# Patient Record
Sex: MALE | Race: WHITE | NOT HISPANIC OR LATINO | Employment: FULL TIME | URBAN - METROPOLITAN AREA
[De-identification: names, ages, dates, MRNs, and addresses within clinical notes are randomized per-mention and may not be internally consistent; named-entity substitution may affect disease eponyms.]

---

## 2023-04-06 ENCOUNTER — TELEPHONE (OUTPATIENT)
Dept: PRIMARY CARE | Facility: CLINIC | Age: 33
End: 2023-04-06
Payer: COMMERCIAL

## 2023-04-06 NOTE — TELEPHONE ENCOUNTER
[facility] called saying the pt has requested cpap supplies and they are going to fax a form over to be signed along with office notes from the last year, and a letter saying he should keep using cpap.  The fax number is [phone]

## 2023-10-10 PROBLEM — R06.2 WHEEZING: Status: ACTIVE | Noted: 2023-10-10

## 2023-10-10 PROBLEM — G47.33 OSA ON CPAP: Status: ACTIVE | Noted: 2023-10-10

## 2023-10-10 PROBLEM — E78.00 PURE HYPERCHOLESTEROLEMIA: Status: ACTIVE | Noted: 2023-10-10

## 2023-10-10 PROBLEM — F80.81 STUTTER: Status: ACTIVE | Noted: 2023-10-10

## 2023-10-10 RX ORDER — LEVOCETIRIZINE DIHYDROCHLORIDE 5 MG/1
5 TABLET, FILM COATED ORAL EVERY EVENING
COMMUNITY

## 2023-10-10 RX ORDER — ALBUTEROL SULFATE 90 UG/1
2 AEROSOL, METERED RESPIRATORY (INHALATION) EVERY 6 HOURS PRN
COMMUNITY
End: 2023-10-11 | Stop reason: SDUPTHER

## 2023-10-10 ASSESSMENT — ENCOUNTER SYMPTOMS
VOMITING: 0
DYSPHORIC MOOD: 0
ABDOMINAL PAIN: 0
DIZZINESS: 0
CONSTIPATION: 0
FEVER: 0
COUGH: 0
MYALGIAS: 0
EYE REDNESS: 0
FREQUENCY: 0
WHEEZING: 0
SHORTNESS OF BREATH: 0
ADENOPATHY: 0
DIARRHEA: 0
PALPITATIONS: 0
EYE DISCHARGE: 0
NAUSEA: 0
RHINORRHEA: 0
ARTHRALGIAS: 0
NERVOUS/ANXIOUS: 0
CHILLS: 0
BRUISES/BLEEDS EASILY: 0
HEADACHES: 0
DYSURIA: 0
POLYPHAGIA: 0
POLYDIPSIA: 0
SORE THROAT: 0
HEMATURIA: 0
FATIGUE: 0
BLOOD IN STOOL: 0

## 2023-10-10 NOTE — PROGRESS NOTES
Subjective   Patient ID: Hitesh Zuniga is a 33 y.o. male who presents for Annual Exam.  Is pt fasting? Yes  Does pt see any providers other than me: No    Does pt want flu shot? Yes  Using cpap nightly? Yes  Less tired during the day? Yes  Bps at home-135/76 yesterday  Last use of albuterol- about 1 month ago    No care team member to display    HPI  Last labs-1/2021 trigs 199, hdl 39, ldl 134, tsh nl, cmp nl, cbc nl  Due for labs- all    Cholesterol   Date Value Ref Range Status   11/30/2021 213 (H) 0 - 199 mg/dL Final     Comment:     .      AGE      DESIRABLE   BORDERLINE HIGH   HIGH     0-19 Y     0 - 169       170 - 199     >/= 200    20-24 Y     0 - 189       190 - 224     >/= 225         >24 Y     0 - 199       200 - 239     >/= 240   **All ranges are based on fasting samples. Specific   therapeutic targets will vary based on patient-specific   cardiac risk.  .   Pediatric guidelines reference:Pediatrics 2011, 128(S5).   Adult guidelines reference: NCEP ATPIII Guidelines,     NEVIN 2001, 258:2486-97  .   Venipuncture immediately after or during the    administration of Metamizole may lead to falsely   low results. Testing should be performed immediately   prior to Metamizole dosing.     12/01/2020 173 0 - 199 mg/dL Final     Comment:     .      AGE      DESIRABLE   BORDERLINE HIGH   HIGH     0-19 Y     0 - 169       170 - 199     >/= 200    20-24 Y     0 - 189       190 - 224     >/= 225         >24 Y     0 - 199       200 - 239     >/= 240   **All ranges are based on fasting samples. Specific   therapeutic targets will vary based on patient-specific   cardiac risk.  .   Pediatric guidelines reference:Pediatrics 2011, 128(S5).   Adult guidelines reference: NCEP ATPIII Guidelines,     NEVIN 2001, 258:2486-97  .   Venipuncture immediately after or during the    administration of Metamizole may lead to falsely   low results. Testing should be performed immediately   prior to Metamizole dosing.     11/27/2019 205  (H) 0 - 199 mg/dL Final     Comment:     .      AGE      DESIRABLE   BORDERLINE HIGH   HIGH     0-19 Y     0 - 169       170 - 199     >/= 200    20-24 Y     0 - 189       190 - 224     >/= 225         >24 Y     0 - 199       200 - 239     >/= 240   **All ranges are based on fasting samples. Specific   therapeutic targets will vary based on patient-specific   cardiac risk.  .   Pediatric guidelines reference:Pediatrics 2011, 128(S5).   Adult guidelines reference: NCEP ATPIII Guidelines,     NEVIN 2001, 258:2486-97  .   Venipuncture immediately after or during the    administration of Metamizole may lead to falsely   low results. Testing should be performed immediately   prior to Metamizole dosing.       Triglycerides   Date Value Ref Range Status   11/30/2021 199 (H) 0 - 149 mg/dL Final     Comment:     .      AGE      DESIRABLE   BORDERLINE HIGH   HIGH     VERY HIGH   0 D-90 D    19 - 174         ----         ----        ----  91 D- 9 Y     0 -  74        75 -  99     >/= 100      ----    10-19 Y     0 -  89        90 - 129     >/= 130      ----    20-24 Y     0 - 114       115 - 149     >/= 150      ----         >24 Y     0 - 149       150 - 199    200- 499    >/= 500  .   Venipuncture immediately after or during the    administration of Metamizole may lead to falsely   low results. Testing should be performed immediately   prior to Metamizole dosing.     12/01/2020 157 (H) 0 - 149 mg/dL Final     Comment:     .      AGE      DESIRABLE   BORDERLINE HIGH   HIGH     VERY HIGH   0 D-90 D    19 - 174         ----         ----        ----  91 D- 9 Y     0 -  74        75 -  99     >/= 100      ----    10-19 Y     0 -  89        90 - 129     >/= 130      ----    20-24 Y     0 - 114       115 - 149     >/= 150      ----         >24 Y     0 - 149       150 - 199    200- 499    >/= 500  .   Venipuncture immediately after or during the    administration of Metamizole may lead to falsely   low results. Testing should be  "performed immediately   prior to Metamizole dosing.     11/27/2019 161 (H) 0 - 149 mg/dL Final     Comment:     .      AGE      DESIRABLE   BORDERLINE HIGH   HIGH     VERY HIGH   0 D-90 D    19 - 174         ----         ----        ----  91 D- 9 Y     0 -  74        75 -  99     >/= 100      ----    10-19 Y     0 -  89        90 - 129     >/= 130      ----    20-24 Y     0 - 114       115 - 149     >/= 150      ----         >24 Y     0 - 149       150 - 199    200- 499    >/= 500  .   Venipuncture immediately after or during the    administration of Metamizole may lead to falsely   low results. Testing should be performed immediately   prior to Metamizole dosing.       HDL   Date Value Ref Range Status   11/30/2021 39.5 (A) mg/dL Final     Comment:     .      AGE      VERY LOW   LOW     NORMAL    HIGH       0-19 Y       < 35   < 40     40-45     ----    20-24 Y       ----   < 40       >45     ----      >24 Y       ----   < 40     40-60      >60  .     12/01/2020 39.0 (A) mg/dL Final     Comment:     .      AGE      VERY LOW   LOW     NORMAL    HIGH       0-19 Y       < 35   < 40     40-45     ----    20-24 Y       ----   < 40       >45     ----      >24 Y       ----   < 40     40-60      >60  .     11/27/2019 37.6 (A) mg/dL Final     Comment:     .      AGE      VERY LOW   LOW     NORMAL    HIGH       0-19 Y       < 35   < 40     40-45     ----    20-24 Y       ----   < 40       >45     ----      >24 Y       ----   < 40     40-60      >60  .       No results found for: \"LDL\"  TSH   Date Value Ref Range Status   11/30/2021 2.21 0.44 - 3.98 mIU/L Final     Comment:      TSH testing is performed using different testing    methodology at [facility] than at other    [facility] hospitals. Direct result comparisons should    only be made within the same method.       No results found for: \"A1C\"  No components found for: \"POCA1C\"  No results found for: \"ALBUR\"  No components found for: \"POCALBUR\"      Other " concerns: dog allergy-asthma kicks in-wants another med for this  Bad breath  Cpap causes sinus issues    ER/urgicare visits in the last year- none  Hospitalizations in the last year- none    FH colon ca-none  FH prostate ca-none    Exercise-  work  Diet-3 meals a day   Body mass index is 39.57 kg/m².    last dental appt- last wk    BMs-regular  Sleep-able to fall asleep and stay asleep; no snoring or apnea  no cp, swelling, sob, abd pain, n/v/d/c, blood in stool or black stools  STI testing including hiv (age 15-65) and hep c screening (18-79)-declines        Immunization History   Administered Date(s) Administered    Moderna SARS-CoV-2 Vaccination 01/06/2021, 02/03/2021, 12/11/2021    Tdap vaccine, age 7 year and older (BOOSTRIX) 01/02/2014         fractures in lifetime-none      FH heart attack, heart surgery-none  FH stroke-none    The ASCVD Risk score (Curtis DK, et al., 2019) failed to calculate for the following reasons:    The 2019 ASCVD risk score is only valid for ages 40 to 79        Review of Systems   Constitutional:  Negative for chills, fatigue and fever.   HENT:  Negative for congestion, ear pain, postnasal drip, rhinorrhea and sore throat.    Eyes:  Negative for discharge, redness and visual disturbance.   Respiratory:  Negative for cough, shortness of breath and wheezing.    Cardiovascular:  Negative for chest pain, palpitations and leg swelling.   Gastrointestinal:  Negative for abdominal pain, blood in stool, constipation, diarrhea, nausea and vomiting.   Endocrine: Negative for cold intolerance, polydipsia, polyphagia and polyuria.   Genitourinary:  Negative for dysuria, frequency, genital sores, hematuria, penile pain, testicular pain and urgency.   Musculoskeletal:  Negative for arthralgias and myalgias.   Skin:  Negative for rash.   Neurological:  Negative for dizziness, syncope and headaches.   Hematological:  Negative for adenopathy. Does not bruise/bleed easily.   Psychiatric/Behavioral:   Negative for dysphoric mood. The patient is not nervous/anxious.        Objective   Visit Vitals  /89   Pulse 70   Temp 36.6 °C (97.9 °F)      BP Readings from Last 3 Encounters:   10/11/23 150/89   06/02/22 124/82   05/05/22 (!) 172/103     Wt Readings from Last 3 Encounters:   10/11/23 125 kg (275 lb 12.8 oz)   06/02/22 126 kg (277 lb 4 oz)   05/05/22 125 kg (275 lb)           Physical Exam  Vitals reviewed.   Constitutional:       General: He is not in acute distress.     Appearance: Normal appearance. He is not ill-appearing.   HENT:      Head: Normocephalic.      Right Ear: Tympanic membrane, ear canal and external ear normal.      Left Ear: Tympanic membrane, ear canal and external ear normal.      Nose: Nose normal.      Mouth/Throat:      Mouth: Mucous membranes are moist.      Pharynx: Oropharynx is clear. No oropharyngeal exudate or posterior oropharyngeal erythema.   Eyes:      Extraocular Movements: Extraocular movements intact.      Conjunctiva/sclera: Conjunctivae normal.      Pupils: Pupils are equal, round, and reactive to light.   Cardiovascular:      Rate and Rhythm: Normal rate and regular rhythm.      Heart sounds: Normal heart sounds. No murmur heard.  Pulmonary:      Effort: Pulmonary effort is normal. No respiratory distress.      Breath sounds: Normal breath sounds. No wheezing, rhonchi or rales.   Abdominal:      General: Bowel sounds are normal. There is no distension.      Palpations: Abdomen is soft. There is no mass.      Tenderness: There is no abdominal tenderness.   Musculoskeletal:         General: No swelling or tenderness. Normal range of motion.      Cervical back: Normal range of motion and neck supple. No tenderness.      Right lower leg: No edema.      Left lower leg: No edema.   Lymphadenopathy:      Cervical: No cervical adenopathy.   Skin:     General: Skin is warm.      Findings: No rash.   Neurological:      General: No focal deficit present.      Mental Status: He  is alert and oriented to person, place, and time.      Cranial Nerves: No cranial nerve deficit.   Psychiatric:         Mood and Affect: Mood normal.         Behavior: Behavior normal.         Assessment/Plan   Diagnoses and all orders for this visit:  Routine general medical examination at a health care facility  -     Comprehensive Metabolic Panel; Future  -     CBC and Auto Differential; Future  -     Lipid Panel; Future  -     TSH with reflex to Free T4 if abnormal; Future  BMI 39.0-39.9,adult  Class 2 severe obesity due to excess calories with serious comorbidity and body mass index (BMI) of 39.0 to 39.9 in adult (CMS/[facility])  Seasonal allergies  -     montelukast (Singulair) 10 mg tablet; Take 1 tablet (10 mg) by mouth once daily at bedtime.  Wheezing  -     albuterol 90 mcg/actuation inhaler; Inhale 2 puffs every 6 hours if needed for wheezing.  Bad breath  -     pantoprazole (ProtoNix) 40 mg EC tablet; Take 1 tablet (40 mg) by mouth once daily. Do not crush, chew, or split.  Other orders  -     Flu vaccine (IIV4) age 6 months and greater, preservative free  -     Follow Up In Primary Care - Established; Future

## 2023-10-11 ENCOUNTER — OFFICE VISIT (OUTPATIENT)
Dept: PRIMARY CARE | Facility: CLINIC | Age: 33
End: 2023-10-11
Payer: COMMERCIAL

## 2023-10-11 VITALS
HEIGHT: 70 IN | SYSTOLIC BLOOD PRESSURE: 135 MMHG | TEMPERATURE: 97.9 F | WEIGHT: 275.8 LBS | HEART RATE: 70 BPM | DIASTOLIC BLOOD PRESSURE: 76 MMHG | BODY MASS INDEX: 39.48 KG/M2

## 2023-10-11 DIAGNOSIS — Z00.00 ROUTINE GENERAL MEDICAL EXAMINATION AT A HEALTH CARE FACILITY: Primary | ICD-10-CM

## 2023-10-11 DIAGNOSIS — E66.01 CLASS 2 SEVERE OBESITY DUE TO EXCESS CALORIES WITH SERIOUS COMORBIDITY AND BODY MASS INDEX (BMI) OF 39.0 TO 39.9 IN ADULT (MULTI): ICD-10-CM

## 2023-10-11 DIAGNOSIS — J30.2 SEASONAL ALLERGIES: ICD-10-CM

## 2023-10-11 DIAGNOSIS — R19.6 BAD BREATH: ICD-10-CM

## 2023-10-11 DIAGNOSIS — R06.2 WHEEZING: ICD-10-CM

## 2023-10-11 PROBLEM — E66.812 CLASS 2 SEVERE OBESITY DUE TO EXCESS CALORIES WITH SERIOUS COMORBIDITY AND BODY MASS INDEX (BMI) OF 39.0 TO 39.9 IN ADULT: Status: ACTIVE | Noted: 2023-10-11

## 2023-10-11 LAB
NON-UH HIE A/G RATIO: 1.3
NON-UH HIE ALB: 4.4 G/DL (ref 3.4–5)
NON-UH HIE ALK PHOS: 83 UNIT/L (ref 46–116)
NON-UH HIE BASO COUNT: 0.1 X1000 (ref 0–0.2)
NON-UH HIE BASOS %: 1.4 %
NON-UH HIE BILIRUBIN, TOTAL: 0.6 MG/DL (ref 0.2–1)
NON-UH HIE BUN/CREAT RATIO: 13.3
NON-UH HIE BUN: 12 MG/DL (ref 9–23)
NON-UH HIE CALCIUM: 9.4 MG/DL (ref 8.7–10.4)
NON-UH HIE CALCULATED LDL CHOLESTEROL: 124 MG/DL (ref 60–130)
NON-UH HIE CALCULATED OSMOLALITY: 279 MOSM/KG (ref 275–295)
NON-UH HIE CHLORIDE: 106 MMOL/L (ref 98–107)
NON-UH HIE CHOLESTEROL: 192 MG/DL (ref 100–200)
NON-UH HIE CO2, VENOUS: 26 MMOL/L (ref 20–31)
NON-UH HIE CREATININE: 0.9 MG/DL (ref 0.6–1.1)
NON-UH HIE DIFF?: NO
NON-UH HIE EOS COUNT: 0.57 X1000 (ref 0–0.5)
NON-UH HIE EOSIN %: 7.6 %
NON-UH HIE GFR AA: >60
NON-UH HIE GLOBULIN: 3.3 G/DL
NON-UH HIE GLOMERULAR FILTRATION RATE: >60 ML/MIN/1.73M?
NON-UH HIE GLUCOSE: 99 MG/DL (ref 74–106)
NON-UH HIE GOT: 41 UNIT/L (ref 15–37)
NON-UH HIE GPT: 55 UNIT/L (ref 10–49)
NON-UH HIE HCT: 46.5 % (ref 41–52)
NON-UH HIE HDL CHOLESTEROL: 47 MG/DL (ref 40–60)
NON-UH HIE HGB: 15.5 G/DL (ref 13.5–17.5)
NON-UH HIE INSTR WBC: 7.5
NON-UH HIE K: 3.9 MMOL/L (ref 3.5–5.1)
NON-UH HIE LYMPH %: 24.8 %
NON-UH HIE LYMPH COUNT: 1.86 X1000 (ref 1.2–4.8)
NON-UH HIE MCH: 29.4 PG (ref 27–34)
NON-UH HIE MCHC: 33.4 G/DL (ref 32–37)
NON-UH HIE MCV: 88 FL (ref 80–100)
NON-UH HIE MONO %: 5.5 %
NON-UH HIE MONO COUNT: 0.42 X1000 (ref 0.1–1)
NON-UH HIE MPV: 7.2 FL (ref 7.4–10.4)
NON-UH HIE NA: 140 MMOL/L (ref 135–145)
NON-UH HIE NEUTROPHIL %: 60.8 %
NON-UH HIE NEUTROPHIL COUNT (ANC): 4.57 X1000 (ref 1.4–8.8)
NON-UH HIE NUCLEATED RBC: 0 /100WBC
NON-UH HIE PLATELET: 297 X10 (ref 150–450)
NON-UH HIE RBC: 5.29 X10 (ref 4.7–6.1)
NON-UH HIE RDW: 13 % (ref 11.5–14.5)
NON-UH HIE TOTAL CHOL/HDL CHOL RATIO: 4.1
NON-UH HIE TOTAL PROTEIN: 7.7 G/DL (ref 5.7–8.2)
NON-UH HIE TRIGLYCERIDES: 104 MG/DL (ref 30–150)
NON-UH HIE TSH: 0.66 UIU/ML (ref 0.55–4.78)
NON-UH HIE WBC: 7.5 X10 (ref 4.5–11)

## 2023-10-11 PROCEDURE — 99395 PREV VISIT EST AGE 18-39: CPT | Performed by: NURSE PRACTITIONER

## 2023-10-11 PROCEDURE — 3008F BODY MASS INDEX DOCD: CPT | Performed by: NURSE PRACTITIONER

## 2023-10-11 PROCEDURE — 1036F TOBACCO NON-USER: CPT | Performed by: NURSE PRACTITIONER

## 2023-10-11 PROCEDURE — 90686 IIV4 VACC NO PRSV 0.5 ML IM: CPT | Performed by: NURSE PRACTITIONER

## 2023-10-11 PROCEDURE — 90471 IMMUNIZATION ADMIN: CPT | Performed by: NURSE PRACTITIONER

## 2023-10-11 RX ORDER — ALBUTEROL SULFATE 90 UG/1
2 AEROSOL, METERED RESPIRATORY (INHALATION) EVERY 6 HOURS PRN
Qty: 18 G | Refills: 1 | Status: SHIPPED | OUTPATIENT
Start: 2023-10-11

## 2023-10-11 RX ORDER — PANTOPRAZOLE SODIUM 40 MG/1
40 TABLET, DELAYED RELEASE ORAL DAILY
Qty: 30 TABLET | Refills: 12 | Status: SHIPPED | OUTPATIENT
Start: 2023-10-11 | End: 2023-11-06

## 2023-10-11 RX ORDER — MONTELUKAST SODIUM 10 MG/1
10 TABLET ORAL NIGHTLY
Qty: 90 TABLET | Refills: 4 | Status: SHIPPED | OUTPATIENT
Start: 2023-10-11 | End: 2025-01-03

## 2023-10-11 ASSESSMENT — PATIENT HEALTH QUESTIONNAIRE - PHQ9
1. LITTLE INTEREST OR PLEASURE IN DOING THINGS: NOT AT ALL
2. FEELING DOWN, DEPRESSED OR HOPELESS: NOT AT ALL
SUM OF ALL RESPONSES TO PHQ9 QUESTIONS 1 AND 2: 0

## 2023-10-11 NOTE — PATIENT INSTRUCTIONS
Add singulair for allergies and asthma    Trial of pantoprazole for bad breath  If not helping see dentist    Med refilled. The number of refills on the meds match when you need to return to the office for an appt. I recommend making your next appt today so you don't run out of your medication as it may take me up to 3 days to refill it.    Handouts given to pt:  physical handout      Labs:    You can use the lab in our building when fasting. The hrs are: Monday-Friday, 7 a.m. - 5 p.m., Saturday 8 a.m. - 12 noon.   No appt needed, BUT YOU DO NEED THE PAPER ORDER.    Fasting is no food, drink, gum or mints other than water for 12 hrs.   Results will be back in 2-3 business days for most labs. It is always recommended for any orders (labs, xrays, ultrasounds,MRI, ct scan, procedures etc) to check with your insurance provider for expected costs or expenses to you.         You will get your results via phone from my medical assistant if you do not have MyChart.  OR  You will get your results via [employer]    If a result is urgent, I will call to speak to you.    Vaccines:  ---- flu vaccine-today      General recommendations:  Exercise-cardio 4-5d/wk 30min each day  Diet-Breakfast-toast (my favorite Lexy Harris Delighful Multigrain or Fausto's Killer Bread Good Seed thin-sliced)/bagel/English muffin-whole wheat flour as a 1st ingredient or cereal/oatmeal/granola bar-fiber 4g or more or protein like eggs or peanut butter; optional veggies  Lunch-protein, 1/2c carb or 2 slices bread, veg 1c  Dinner-protein, fist sized carb, veg 1c  Fruit 2 a day  Dairy 2 a day-milk, soy milk, almond milk, cheese, yogurt, cottage cheese  Snacks-Protein-hard boiled egg, nuts (walnuts/almonds/pecans/pistachios 1/4c), hummus, beef/deer jerky or meat sticks; vegetable, fruit, dairy-milk(1%, skim, almond, soy)/cheese (not a lot of cheddar)/yogurt (Greek is best-my favorite Dannon Fruit on the Bottom Greek)/cottage cheese 2%; triscuits/ popcorn/wheat  thins have a lot of fiber; follow serving size on bag/box/container  increase water  Limit alcohol to 1 drink per day for women and 2 drinks per day for men (1 drink=12oz beer or 5oz wine or 1 1/2oz liquor)  Calcium: 500mg 1 twice a day if age 50 and younger and 600mg 1 twice a day if over age 50 (calcium citrate can be taken without food)  Vitamin D: 800-5000 IU/day  Limit salt to <2300mg a day if age 50 and under and <1500mg a day if over age 50/have high bp or diabetes or kidney disease  Recommend folate for childbearing age women 0.4mg per day (can be found in a multivitamin)  Recommend 18mg/dL of iron a day if age 50 and under and 8mg/dL a day if over age 50; take on an empty stomach at bedtime  Use sunscreen   Wear seatbelt  Recommend safe sex practices: using condoms everytime you have sex, discuss with a new partner about their past partners/history of STDs/drug use, avoid drinking alcohol or using drugs as this increases the chance that you will participate in high-risk sex, for oral sex help protect your mouth by having your partner use a condom (male or female), women should not douche after sex, be aware of your partner's body and your body-look for signs of a sore, blister, rash, or discharge, and have regular exams and periodic tests for STDs.  No distracted driving  No driving when under influence of substances  Wear a seatbelt  Eye dr every 1-2yrs  Dentist every 6-12 mon  Tetanus shot every 10yrs  Recommend flu vaccine in the fall  Appt in 6mon for follow up on diet, exercise, cholesterol and 1 year for physical      I will communicate with you via [employer] regarding messages and results. If you need help with this, you can call the support line at [phone].    IT WAS A PLEASURE TO SEE YOU TODAY. THANK YOU FOR CHOOSING US FOR YOUR HEALTHCARE NEEDS.

## 2023-11-06 DIAGNOSIS — R19.6 BAD BREATH: ICD-10-CM

## 2023-11-06 RX ORDER — PANTOPRAZOLE SODIUM 40 MG/1
40 TABLET, DELAYED RELEASE ORAL DAILY
Qty: 90 TABLET | Refills: 2 | Status: SHIPPED | OUTPATIENT
Start: 2023-11-06 | End: 2024-01-22 | Stop reason: ALTCHOICE

## 2024-01-05 ENCOUNTER — PATIENT MESSAGE (OUTPATIENT)
Dept: PRIMARY CARE | Facility: CLINIC | Age: 34
End: 2024-01-05

## 2024-01-05 DIAGNOSIS — I10 PRIMARY HYPERTENSION: Primary | ICD-10-CM

## 2024-01-05 RX ORDER — LISINOPRIL 10 MG/1
10 TABLET ORAL DAILY
Qty: 30 TABLET | Refills: 1 | Status: SHIPPED | OUTPATIENT
Start: 2024-01-05 | End: 2024-01-22 | Stop reason: ALTCHOICE

## 2024-01-20 ASSESSMENT — ENCOUNTER SYMPTOMS
WHEEZING: 0
CONSTITUTIONAL NEGATIVE: 1
SHORTNESS OF BREATH: 0

## 2024-01-20 NOTE — PROGRESS NOTES
Subjective   Patient ID: Hitesh Zuniga is a 33 y.o. male who presents for Follow-up.  Last physical:10/11/23      BPs at home (put an avg of the numbers)- 140/90  150/98      Any other questions or concerns that pt wants to discuss today? No     Hypertension  This is a new problem. The current episode started 1 to 4 weeks ago. The problem has been waxing and waning since onset. The problem is controlled. Associated symptoms include anxiety. Pertinent negatives include no chest pain or shortness of breath. There are no associated agents to hypertension. Risk factors for coronary artery disease include obesity. Compliance problems include psychosocial issues.      Last labs-10/2023   Sugar (aka glucose), kidney function, liver function and electrolytes in the CMP (comprehensive metabolic panel) were normal.  CBC (complete blood count) was normal which looks at infection and anemia markers.  TSH (thyroid test) was normal.  Hdl and trigs normal.  Ldl high at 124. Goal <100. Decr fats and incr fiber.  Due for labs- none    Cholesterol   Date Value Ref Range Status   11/30/2021 213 (H) 0 - 199 mg/dL Final     Comment:     .      AGE      DESIRABLE   BORDERLINE HIGH   HIGH     0-19 Y     0 - 169       170 - 199     >/= 200    20-24 Y     0 - 189       190 - 224     >/= 225         >24 Y     0 - 199       200 - 239     >/= 240   **All ranges are based on fasting samples. Specific   therapeutic targets will vary based on patient-specific   cardiac risk.  .   Pediatric guidelines reference:Pediatrics 2011, 128(S5).   Adult guidelines reference: NCEP ATPIII Guidelines,     NEVIN 2001, 258:2486-97  .   Venipuncture immediately after or during the    administration of Metamizole may lead to falsely   low results. Testing should be performed immediately   prior to Metamizole dosing.     12/01/2020 173 0 - 199 mg/dL Final     Comment:     .      AGE      DESIRABLE   BORDERLINE HIGH   HIGH     0-19 Y     0 - 169       170 - 199      >/= 200    20-24 Y     0 - 189       190 - 224     >/= 225         >24 Y     0 - 199       200 - 239     >/= 240   **All ranges are based on fasting samples. Specific   therapeutic targets will vary based on patient-specific   cardiac risk.  .   Pediatric guidelines reference:Pediatrics 2011, 128(S5).   Adult guidelines reference: NCEP ATPIII Guidelines,     NEVIN 2001, 258:2486-97  .   Venipuncture immediately after or during the    administration of Metamizole may lead to falsely   low results. Testing should be performed immediately   prior to Metamizole dosing.     11/27/2019 205 (H) 0 - 199 mg/dL Final     Comment:     .      AGE      DESIRABLE   BORDERLINE HIGH   HIGH     0-19 Y     0 - 169       170 - 199     >/= 200    20-24 Y     0 - 189       190 - 224     >/= 225         >24 Y     0 - 199       200 - 239     >/= 240   **All ranges are based on fasting samples. Specific   therapeutic targets will vary based on patient-specific   cardiac risk.  .   Pediatric guidelines reference:Pediatrics 2011, 128(S5).   Adult guidelines reference: NCEP ATPIII Guidelines,     NEVIN 2001, 258:2486-97  .   Venipuncture immediately after or during the    administration of Metamizole may lead to falsely   low results. Testing should be performed immediately   prior to Metamizole dosing.       Triglycerides   Date Value Ref Range Status   11/30/2021 199 (H) 0 - 149 mg/dL Final     Comment:     .      AGE      DESIRABLE   BORDERLINE HIGH   HIGH     VERY HIGH   0 D-90 D    19 - 174         ----         ----        ----  91 D- 9 Y     0 -  74        75 -  99     >/= 100      ----    10-19 Y     0 -  89        90 - 129     >/= 130      ----    20-24 Y     0 - 114       115 - 149     >/= 150      ----         >24 Y     0 - 149       150 - 199    200- 499    >/= 500  .   Venipuncture immediately after or during the    administration of Metamizole may lead to falsely   low results. Testing should be performed immediately   prior to  Metamizole dosing.     12/01/2020 157 (H) 0 - 149 mg/dL Final     Comment:     .      AGE      DESIRABLE   BORDERLINE HIGH   HIGH     VERY HIGH   0 D-90 D    19 - 174         ----         ----        ----  91 D- 9 Y     0 -  74        75 -  99     >/= 100      ----    10-19 Y     0 -  89        90 - 129     >/= 130      ----    20-24 Y     0 - 114       115 - 149     >/= 150      ----         >24 Y     0 - 149       150 - 199    200- 499    >/= 500  .   Venipuncture immediately after or during the    administration of Metamizole may lead to falsely   low results. Testing should be performed immediately   prior to Metamizole dosing.     11/27/2019 161 (H) 0 - 149 mg/dL Final     Comment:     .      AGE      DESIRABLE   BORDERLINE HIGH   HIGH     VERY HIGH   0 D-90 D    19 - 174         ----         ----        ----  91 D- 9 Y     0 -  74        75 -  99     >/= 100      ----    10-19 Y     0 -  89        90 - 129     >/= 130      ----    20-24 Y     0 - 114       115 - 149     >/= 150      ----         >24 Y     0 - 149       150 - 199    200- 499    >/= 500  .   Venipuncture immediately after or during the    administration of Metamizole may lead to falsely   low results. Testing should be performed immediately   prior to Metamizole dosing.       HDL   Date Value Ref Range Status   11/30/2021 39.5 (A) mg/dL Final     Comment:     .      AGE      VERY LOW   LOW     NORMAL    HIGH       0-19 Y       < 35   < 40     40-45     ----    20-24 Y       ----   < 40       >45     ----      >24 Y       ----   < 40     40-60      >60  .     12/01/2020 39.0 (A) mg/dL Final     Comment:     .      AGE      VERY LOW   LOW     NORMAL    HIGH       0-19 Y       < 35   < 40     40-45     ----    20-24 Y       ----   < 40       >45     ----      >24 Y       ----   < 40     40-60      >60  .     11/27/2019 37.6 (A) mg/dL Final     Comment:     .      AGE      VERY LOW   LOW     NORMAL    HIGH       0-19 Y       < 35   < 40     40-45     " ----    20-24 Y       ----   < 40       >45     ----      >24 Y       ----   < 40     40-60      >60  .       No results found for: \"LDL\"  TSH   Date Value Ref Range Status   11/30/2021 2.21 0.44 - 3.98 mIU/L Final     Comment:      TSH testing is performed using different testing    methodology at [facility] than at other    [facility] hospitals. Direct result comparisons should    only be made within the same method.       No results found for: \"A1C\"  No components found for: \"POCA1C\"  No results found for: \"ALBUR\"  No components found for: \"POCALBUR\"    1/5/24 pt messaged me:  I have been taking my blood pressure regularly this week and it's been a consistent BP of around 170/90.   1/5/24 pt started on lisinopril  No cp, heart racing, sob, wheezing, dizziness, HA or vision change    Bps at home now:140-50s/90s    Started Baby aspirin 81mg 1 a day    Exercise-walk and jog on treadmill and wts but then stopped due to home project; is going to restart  Diet-3 meals a day-dinner bigger  Water, green tea    Immunization History   Administered Date(s) Administered    Flu vaccine (IIV4), preservative free *Check age/dose* 10/11/2023    Moderna SARS-CoV-2 Vaccination 01/06/2021, 02/03/2021, 12/11/2021    Tdap vaccine, age 7 year and older (BOOSTRIX) 01/02/2014        No care team member to display     Review of Systems   Constitutional: Negative.    Respiratory:  Negative for shortness of breath and wheezing.    Cardiovascular:  Negative for chest pain.       Objective   Visit Vitals  /88   Pulse 81   Temp 36.2 °C (97.1 °F)      BP Readings from Last 3 Encounters:   01/22/24 151/88   10/11/23 135/76   06/02/22 124/82     Wt Readings from Last 3 Encounters:   01/22/24 126 kg (276 lb 12.8 oz)   10/11/23 125 kg (275 lb 12.8 oz)   06/02/22 126 kg (277 lb 4 oz)       The ASCVD Risk score (Curtis DUVAL, et al., 2019) failed to calculate for the following reasons:    The 2019 ASCVD risk score is only valid for " ages 40 to 79     Physical Exam  Constitutional:       General: He is not in acute distress.     Appearance: Normal appearance.   Neurological:      Mental Status: He is alert.       Assessment/Plan   Diagnoses and all orders for this visit:  Primary hypertension  -     lisinopril 20 mg tablet; Take 1 tablet (20 mg) by mouth once daily.  BMI 39.0-39.9,adult  Class 2 severe obesity due to excess calories with serious comorbidity and body mass index (BMI) of 39.0 to 39.9 in adult (CMS/[facility])  Other orders  -     Follow Up In Primary Care - Established; Future

## 2024-01-21 ASSESSMENT — ENCOUNTER SYMPTOMS: HYPERTENSION: 1

## 2024-01-22 ENCOUNTER — OFFICE VISIT (OUTPATIENT)
Dept: PRIMARY CARE | Facility: CLINIC | Age: 34
End: 2024-01-22
Payer: COMMERCIAL

## 2024-01-22 VITALS
WEIGHT: 276.8 LBS | HEIGHT: 70 IN | TEMPERATURE: 97.1 F | HEART RATE: 81 BPM | SYSTOLIC BLOOD PRESSURE: 158 MMHG | OXYGEN SATURATION: 96 % | DIASTOLIC BLOOD PRESSURE: 83 MMHG | BODY MASS INDEX: 39.63 KG/M2

## 2024-01-22 DIAGNOSIS — I10 PRIMARY HYPERTENSION: Primary | ICD-10-CM

## 2024-01-22 DIAGNOSIS — E66.01 CLASS 2 SEVERE OBESITY DUE TO EXCESS CALORIES WITH SERIOUS COMORBIDITY AND BODY MASS INDEX (BMI) OF 39.0 TO 39.9 IN ADULT (MULTI): ICD-10-CM

## 2024-01-22 PROCEDURE — 3079F DIAST BP 80-89 MM HG: CPT | Performed by: NURSE PRACTITIONER

## 2024-01-22 PROCEDURE — 1036F TOBACCO NON-USER: CPT | Performed by: NURSE PRACTITIONER

## 2024-01-22 PROCEDURE — 3077F SYST BP >= 140 MM HG: CPT | Performed by: NURSE PRACTITIONER

## 2024-01-22 PROCEDURE — 99214 OFFICE O/P EST MOD 30 MIN: CPT | Performed by: NURSE PRACTITIONER

## 2024-01-22 PROCEDURE — 3008F BODY MASS INDEX DOCD: CPT | Performed by: NURSE PRACTITIONER

## 2024-01-22 RX ORDER — LISINOPRIL 20 MG/1
20 TABLET ORAL DAILY
Qty: 30 TABLET | Refills: 5 | Status: SHIPPED | OUTPATIENT
Start: 2024-01-22 | End: 2024-05-06 | Stop reason: SDUPTHER

## 2024-01-22 ASSESSMENT — PATIENT HEALTH QUESTIONNAIRE - PHQ9
1. LITTLE INTEREST OR PLEASURE IN DOING THINGS: NOT AT ALL
SUM OF ALL RESPONSES TO PHQ9 QUESTIONS 1 AND 2: 0
2. FEELING DOWN, DEPRESSED OR HOPELESS: NOT AT ALL

## 2024-01-22 ASSESSMENT — ENCOUNTER SYMPTOMS: HYPERTENSION: 1

## 2024-01-22 NOTE — PATIENT INSTRUCTIONS
Increase lisinopril to 20mg a day  Goal <140/<90  I will message you in 2wks to obtain bps.    Exercise-cardio 4-5d/wk 30min each day  Diet-Breakfast-toast (my favorite Lexy Harris Delightful multi-grain and Fausto's Killer Bread thin-sliced Good Seed)/bagel/English muffin-whole wheat flour as a 1st ingredient or cereal/oatmeal/granola bar-fiber 4g or more; protein like eggs or peanut butter is Ok  Lunch-protein, veg 1c  Dinner-protein, veg 1c; if having potatoes, rice, noodles, or pasta-->fist sized; could try brown rice or whole wheat pasta or quinoa  Fruit 2 a day  Dairy 2 a day-milk, almond milk, soy milk, yogurt, cottage cheese, yogurt  Snacks-Protein-hard boiled egg, nuts (walnuts/almonds/pecans/pistachios 1/4c), hummus, beef/deer jerky; vegetable, fruit, dairy-milk(1%, skim, almond, soy)/cheese (not a lot of cheddar)/yogurt (Greek is best-my favorite Dannon Fruit on the Bottom Greek)/cottage cheese 2%; triscuits, popcorn have a lot of fiber; serving size  Water  Limit alcohol to 2 drinks per day (1 drink=12oz beer or 5oz wine or 1 1/2oz liquor)  appt in  4  months; be fasting      I will communicate with you via [employer] regarding messages and results. If you need help with this, you can call the support line at [phone].    IT WAS A PLEASURE TO SEE YOU TODAY. THANK YOU FOR CHOOSING US FOR YOUR HEALTHCARE NEEDS.

## 2024-05-02 ASSESSMENT — ENCOUNTER SYMPTOMS
WHEEZING: 0
CONSTITUTIONAL NEGATIVE: 1
HYPERTENSION: 1
SHORTNESS OF BREATH: 0

## 2024-05-02 NOTE — PROGRESS NOTES
Subjective   Patient ID: Hitesh Zuniga is a 33 y.o. male who presents for Follow-up.  Last physical:10/11/23    BPs at home (put an avg of the numbers)-  120/80     Any other questions or concerns that pt wants to discuss today?  No     HPI    Last labs-10/2023   Sugar (aka glucose), kidney function, liver function and electrolytes in the CMP (comprehensive metabolic panel) were normal.  CBC (complete blood count) was normal which looks at infection and anemia markers.  TSH (thyroid test) was normal.  Hdl and trigs normal.  Ldl high at 124. Goal <100. Decr fats and incr fiber.  Due for labs- none    Cholesterol   Date Value Ref Range Status   11/30/2021 213 (H) 0 - 199 mg/dL Final     Comment:     .      AGE      DESIRABLE   BORDERLINE HIGH   HIGH     0-19 Y     0 - 169       170 - 199     >/= 200    20-24 Y     0 - 189       190 - 224     >/= 225         >24 Y     0 - 199       200 - 239     >/= 240   **All ranges are based on fasting samples. Specific   therapeutic targets will vary based on patient-specific   cardiac risk.  .   Pediatric guidelines reference:Pediatrics 2011, 128(S5).   Adult guidelines reference: NCEP ATPIII Guidelines,     NEVIN 2001, 258:2486-97  .   Venipuncture immediately after or during the    administration of Metamizole may lead to falsely   low results. Testing should be performed immediately   prior to Metamizole dosing.     12/01/2020 173 0 - 199 mg/dL Final     Comment:     .      AGE      DESIRABLE   BORDERLINE HIGH   HIGH     0-19 Y     0 - 169       170 - 199     >/= 200    20-24 Y     0 - 189       190 - 224     >/= 225         >24 Y     0 - 199       200 - 239     >/= 240   **All ranges are based on fasting samples. Specific   therapeutic targets will vary based on patient-specific   cardiac risk.  .   Pediatric guidelines reference:Pediatrics 2011, 128(S5).   Adult guidelines reference: NCEP ATPIII Guidelines,     NEVIN 2001, 258:2486-97  .   Venipuncture immediately after or  during the    administration of Metamizole may lead to falsely   low results. Testing should be performed immediately   prior to Metamizole dosing.     11/27/2019 205 (H) 0 - 199 mg/dL Final     Comment:     .      AGE      DESIRABLE   BORDERLINE HIGH   HIGH     0-19 Y     0 - 169       170 - 199     >/= 200    20-24 Y     0 - 189       190 - 224     >/= 225         >24 Y     0 - 199       200 - 239     >/= 240   **All ranges are based on fasting samples. Specific   therapeutic targets will vary based on patient-specific   cardiac risk.  .   Pediatric guidelines reference:Pediatrics 2011, 128(S5).   Adult guidelines reference: NCEP ATPIII Guidelines,     NEVIN 2001, 258:2486-97  .   Venipuncture immediately after or during the    administration of Metamizole may lead to falsely   low results. Testing should be performed immediately   prior to Metamizole dosing.       Triglycerides   Date Value Ref Range Status   11/30/2021 199 (H) 0 - 149 mg/dL Final     Comment:     .      AGE      DESIRABLE   BORDERLINE HIGH   HIGH     VERY HIGH   0 D-90 D    19 - 174         ----         ----        ----  91 D- 9 Y     0 -  74        75 -  99     >/= 100      ----    10-19 Y     0 -  89        90 - 129     >/= 130      ----    20-24 Y     0 - 114       115 - 149     >/= 150      ----         >24 Y     0 - 149       150 - 199    200- 499    >/= 500  .   Venipuncture immediately after or during the    administration of Metamizole may lead to falsely   low results. Testing should be performed immediately   prior to Metamizole dosing.     12/01/2020 157 (H) 0 - 149 mg/dL Final     Comment:     .      AGE      DESIRABLE   BORDERLINE HIGH   HIGH     VERY HIGH   0 D-90 D    19 - 174         ----         ----        ----  91 D- 9 Y     0 -  74        75 -  99     >/= 100      ----    10-19 Y     0 -  89        90 - 129     >/= 130      ----    20-24 Y     0 - 114       115 - 149     >/= 150      ----         >24 Y     0 - 149       150 -  "199    200- 499    >/= 500  .   Venipuncture immediately after or during the    administration of Metamizole may lead to falsely   low results. Testing should be performed immediately   prior to Metamizole dosing.     11/27/2019 161 (H) 0 - 149 mg/dL Final     Comment:     .      AGE      DESIRABLE   BORDERLINE HIGH   HIGH     VERY HIGH   0 D-90 D    19 - 174         ----         ----        ----  91 D- 9 Y     0 -  74        75 -  99     >/= 100      ----    10-19 Y     0 -  89        90 - 129     >/= 130      ----    20-24 Y     0 - 114       115 - 149     >/= 150      ----         >24 Y     0 - 149       150 - 199    200- 499    >/= 500  .   Venipuncture immediately after or during the    administration of Metamizole may lead to falsely   low results. Testing should be performed immediately   prior to Metamizole dosing.       HDL   Date Value Ref Range Status   11/30/2021 39.5 (A) mg/dL Final     Comment:     .      AGE      VERY LOW   LOW     NORMAL    HIGH       0-19 Y       < 35   < 40     40-45     ----    20-24 Y       ----   < 40       >45     ----      >24 Y       ----   < 40     40-60      >60  .     12/01/2020 39.0 (A) mg/dL Final     Comment:     .      AGE      VERY LOW   LOW     NORMAL    HIGH       0-19 Y       < 35   < 40     40-45     ----    20-24 Y       ----   < 40       >45     ----      >24 Y       ----   < 40     40-60      >60  .     11/27/2019 37.6 (A) mg/dL Final     Comment:     .      AGE      VERY LOW   LOW     NORMAL    HIGH       0-19 Y       < 35   < 40     40-45     ----    20-24 Y       ----   < 40       >45     ----      >24 Y       ----   < 40     40-60      >60  .       No results found for: \"LDL\"  TSH   Date Value Ref Range Status   11/30/2021 2.21 0.44 - 3.98 mIU/L Final     Comment:      TSH testing is performed using different testing    methodology at [facility] than at other    [facility] hospitals. Direct result comparisons should    only be made within the same " "method.       No results found for: \"A1C\"  No components found for: \"POCA1C\"  No results found for: \"ALBUR\"  No components found for: \"POCALBUR\"      Exercise-walk a lot at work 09803 steps a day  Diet-3 meals a day-dinner bigger  Water has been increased, green tea    Cpap causing sinus sx: Swelling top of mouth, tooth pain  When not using cpap sx go away  Immunization History   Administered Date(s) Administered    Flu vaccine (IIV4), preservative free *Check age/dose* 10/11/2023    Moderna SARS-CoV-2 Vaccination 01/06/2021, 02/03/2021, 12/11/2021    Tdap vaccine, age 7 year and older (BOOSTRIX, ADACEL) 01/02/2014        No care team member to display     Review of Systems   Constitutional: Negative.    Respiratory:  Negative for shortness of breath and wheezing.    Cardiovascular:  Negative for chest pain and palpitations.   Musculoskeletal:  Negative for neck pain.   Neurological:  Negative for headaches.       Objective   Visit Vitals  /79   Pulse 82   Temp 36.3 °C (97.3 °F)        BP Readings from Last 3 Encounters:   05/06/24 135/79   01/22/24 158/83   10/11/23 135/76     Wt Readings from Last 3 Encounters:   05/06/24 121 kg (266 lb)   01/22/24 126 kg (276 lb 12.8 oz)   10/11/23 125 kg (275 lb 12.8 oz)       The ASCVD Risk score (Curtis DK, et al., 2019) failed to calculate for the following reasons:    The 2019 ASCVD risk score is only valid for ages 40 to 79     Physical Exam  Constitutional:       General: He is not in acute distress.     Appearance: Normal appearance.   Neurological:      Mental Status: He is alert.       Assessment/Plan   Diagnoses and all orders for this visit:  Primary hypertension  -     lisinopril 20 mg tablet; Take 1 tablet (20 mg) by mouth once daily.  Pure hypercholesterolemia  Other orders  -     Follow Up In Primary Care - Established  -     Follow Up In Primary Care - Health Maintenance; Future                   "

## 2024-05-06 ENCOUNTER — OFFICE VISIT (OUTPATIENT)
Dept: PRIMARY CARE | Facility: CLINIC | Age: 34
End: 2024-05-06
Payer: COMMERCIAL

## 2024-05-06 VITALS
DIASTOLIC BLOOD PRESSURE: 79 MMHG | OXYGEN SATURATION: 98 % | TEMPERATURE: 97.3 F | BODY MASS INDEX: 38.08 KG/M2 | HEART RATE: 82 BPM | HEIGHT: 70 IN | WEIGHT: 266 LBS | SYSTOLIC BLOOD PRESSURE: 135 MMHG

## 2024-05-06 DIAGNOSIS — E78.00 PURE HYPERCHOLESTEROLEMIA: ICD-10-CM

## 2024-05-06 DIAGNOSIS — I10 PRIMARY HYPERTENSION: Primary | ICD-10-CM

## 2024-05-06 PROCEDURE — 3008F BODY MASS INDEX DOCD: CPT | Performed by: NURSE PRACTITIONER

## 2024-05-06 PROCEDURE — 1036F TOBACCO NON-USER: CPT | Performed by: NURSE PRACTITIONER

## 2024-05-06 PROCEDURE — 3075F SYST BP GE 130 - 139MM HG: CPT | Performed by: NURSE PRACTITIONER

## 2024-05-06 PROCEDURE — 99214 OFFICE O/P EST MOD 30 MIN: CPT | Performed by: NURSE PRACTITIONER

## 2024-05-06 PROCEDURE — 3078F DIAST BP <80 MM HG: CPT | Performed by: NURSE PRACTITIONER

## 2024-05-06 RX ORDER — LISINOPRIL 20 MG/1
20 TABLET ORAL DAILY
Qty: 90 TABLET | Refills: 1 | Status: SHIPPED | OUTPATIENT
Start: 2024-05-06 | End: 2024-11-02

## 2024-05-06 ASSESSMENT — ENCOUNTER SYMPTOMS
PND: 0
PALPITATIONS: 0
BLURRED VISION: 0
NECK PAIN: 0
HYPERTENSION: 1
HEADACHES: 0
SWEATS: 0
ORTHOPNEA: 0
SHORTNESS OF BREATH: 0

## 2024-05-06 ASSESSMENT — PATIENT HEALTH QUESTIONNAIRE - PHQ9
2. FEELING DOWN, DEPRESSED OR HOPELESS: NOT AT ALL
1. LITTLE INTEREST OR PLEASURE IN DOING THINGS: NOT AT ALL
SUM OF ALL RESPONSES TO PHQ9 QUESTIONS 1 AND 2: 0

## 2024-05-06 NOTE — PATIENT INSTRUCTIONS
Contact cpap company to discuss symptoms that you are having with the cpap    Calorie recommendation: 0018-2517 genesis a day  Exercise-cardio 4-5d/wk 30min each day  Diet-Breakfast-toast (my favorite Lexy Harris Delightful multi-grain and Fausto's Killer Bread thin-sliced Good Seed)/bagel/English muffin-whole wheat flour as a 1st ingredient or cereal/oatmeal/granola bar-fiber 4g or more; protein like eggs or peanut butter is Ok  Lunch-protein, veg 1c  Dinner-protein, veg 1c; if having potatoes, rice, noodles, or pasta-->fist sized; could try brown rice or whole wheat pasta or quinoa  Fruit 2 a day  Dairy 2 a day-milk, almond milk, soy milk, yogurt, cottage cheese, yogurt  Snacks-Protein-hard boiled egg, nuts (walnuts/almonds/pecans/pistachios 1/4c), hummus, beef/deer jerky; vegetable, fruit, dairy-milk(1%, skim, almond, soy)/cheese (not a lot of cheddar)/yogurt (Greek is best-my favorite Dannon Fruit on the Bottom Greek)/cottage cheese 2%; triscuits, popcorn have a lot of fiber; serving size  Water  Limit alcohol to 2 drinks per day (1 drink=12oz beer or 5oz wine or 1 1/2oz liquor)  appt in  6  months; be fasting      I will communicate with you via [employer] regarding messages and results. If you need help with this, you can call the support line at [phone].    IT WAS A PLEASURE TO SEE YOU TODAY. THANK YOU FOR CHOOSING US FOR YOUR HEALTHCARE NEEDS.

## 2024-05-22 ENCOUNTER — APPOINTMENT (OUTPATIENT)
Dept: PRIMARY CARE | Facility: CLINIC | Age: 34
End: 2024-05-22
Payer: COMMERCIAL

## 2024-08-21 DIAGNOSIS — I10 PRIMARY HYPERTENSION: ICD-10-CM

## 2024-08-21 RX ORDER — LISINOPRIL 20 MG/1
20 TABLET ORAL DAILY
Qty: 90 TABLET | Refills: 2 | Status: SHIPPED | OUTPATIENT
Start: 2024-08-21

## 2024-10-13 ASSESSMENT — ENCOUNTER SYMPTOMS
NAUSEA: 0
HEMATURIA: 0
VOMITING: 0
ABDOMINAL PAIN: 0
RHINORRHEA: 0
BLOOD IN STOOL: 0
SORE THROAT: 0
DIZZINESS: 0
CHILLS: 0
PALPITATIONS: 0
BRUISES/BLEEDS EASILY: 0
ADENOPATHY: 0
SHORTNESS OF BREATH: 0
DYSURIA: 0
DIARRHEA: 0
FATIGUE: 0
NERVOUS/ANXIOUS: 0
EYE REDNESS: 0
POLYDIPSIA: 0
COUGH: 0
FEVER: 0
MYALGIAS: 0
CONSTIPATION: 0
DYSPHORIC MOOD: 0
WHEEZING: 0
POLYPHAGIA: 0
HEADACHES: 0
EYE DISCHARGE: 0
FREQUENCY: 0
ARTHRALGIAS: 0

## 2024-10-14 ASSESSMENT — PROMIS GLOBAL HEALTH SCALE
CARRYOUT_PHYSICAL_ACTIVITIES: COMPLETELY
RATE_GENERAL_HEALTH: GOOD
RATE_AVERAGE_PAIN: 0
RATE_PHYSICAL_HEALTH: GOOD
RATE_MENTAL_HEALTH: GOOD
RATE_QUALITY_OF_LIFE: VERY GOOD
RATE_AVERAGE_FATIGUE: MILD
CARRYOUT_SOCIAL_ACTIVITIES: VERY GOOD
EMOTIONAL_PROBLEMS: SOMETIMES
RATE_SOCIAL_SATISFACTION: VERY GOOD

## 2024-10-14 NOTE — PROGRESS NOTES
Subjective   Patient ID: Hitesh Zuniga is a 34 y.o. male who presents for Annual Exam.    Is pt fasting?  Yes   Does pt see any providers other than me: no     Does pt want flu shot? Yes   Does pt want to update his tetanus shot? Yes   Last one was 2014  Using cpap nightly?  Yes   Bps at home- 120/140 to 75/85  Last use of albuterol- does not know   Any questions or concerns? No     No care team member to display    HPI  Last labs-10/2023   Sugar (aka glucose), kidney function, liver function and electrolytes in the CMP (comprehensive metabolic panel) were normal.  CBC (complete blood count) was normal which looks at infection and anemia markers.  TSH (thyroid test) was normal.  Hdl and trigs normal.  Ldl high at 124. Goal <100. Decr fats and incr fiber.  1/2021 trigs 199, hdl 39, ldl 134, tsh nl, cmp nl, cbc nl  Due for labs- all    Cholesterol   Date Value Ref Range Status   11/30/2021 213 (H) 0 - 199 mg/dL Final     Comment:     .      AGE      DESIRABLE   BORDERLINE HIGH   HIGH     0-19 Y     0 - 169       170 - 199     >/= 200    20-24 Y     0 - 189       190 - 224     >/= 225         >24 Y     0 - 199       200 - 239     >/= 240   **All ranges are based on fasting samples. Specific   therapeutic targets will vary based on patient-specific   cardiac risk.  .   Pediatric guidelines reference:Pediatrics 2011, 128(S5).   Adult guidelines reference: NCEP ATPIII Guidelines,     NEVIN 2001, 258:2486-97  .   Venipuncture immediately after or during the    administration of Metamizole may lead to falsely   low results. Testing should be performed immediately   prior to Metamizole dosing.     12/01/2020 173 0 - 199 mg/dL Final     Comment:     .      AGE      DESIRABLE   BORDERLINE HIGH   HIGH     0-19 Y     0 - 169       170 - 199     >/= 200    20-24 Y     0 - 189       190 - 224     >/= 225         >24 Y     0 - 199       200 - 239     >/= 240   **All ranges are based on fasting samples. Specific   therapeutic targets  will vary based on patient-specific   cardiac risk.  .   Pediatric guidelines reference:Pediatrics 2011, 128(S5).   Adult guidelines reference: NCEP ATPIII Guidelines,     NEVIN 2001, 258:2486-97  .   Venipuncture immediately after or during the    administration of Metamizole may lead to falsely   low results. Testing should be performed immediately   prior to Metamizole dosing.     11/27/2019 205 (H) 0 - 199 mg/dL Final     Comment:     .      AGE      DESIRABLE   BORDERLINE HIGH   HIGH     0-19 Y     0 - 169       170 - 199     >/= 200    20-24 Y     0 - 189       190 - 224     >/= 225         >24 Y     0 - 199       200 - 239     >/= 240   **All ranges are based on fasting samples. Specific   therapeutic targets will vary based on patient-specific   cardiac risk.  .   Pediatric guidelines reference:Pediatrics 2011, 128(S5).   Adult guidelines reference: NCEP ATPIII Guidelines,     NEVIN 2001, 258:2486-97  .   Venipuncture immediately after or during the    administration of Metamizole may lead to falsely   low results. Testing should be performed immediately   prior to Metamizole dosing.       Triglycerides   Date Value Ref Range Status   11/30/2021 199 (H) 0 - 149 mg/dL Final     Comment:     .      AGE      DESIRABLE   BORDERLINE HIGH   HIGH     VERY HIGH   0 D-90 D    19 - 174         ----         ----        ----  91 D- 9 Y     0 -  74        75 -  99     >/= 100      ----    10-19 Y     0 -  89        90 - 129     >/= 130      ----    20-24 Y     0 - 114       115 - 149     >/= 150      ----         >24 Y     0 - 149       150 - 199    200- 499    >/= 500  .   Venipuncture immediately after or during the    administration of Metamizole may lead to falsely   low results. Testing should be performed immediately   prior to Metamizole dosing.     12/01/2020 157 (H) 0 - 149 mg/dL Final     Comment:     .      AGE      DESIRABLE   BORDERLINE HIGH   HIGH     VERY HIGH   0 D-90 D    19 - 174         ----         ----  "       ----  91 D- 9 Y     0 -  74        75 -  99     >/= 100      ----    10-19 Y     0 -  89        90 - 129     >/= 130      ----    20-24 Y     0 - 114       115 - 149     >/= 150      ----         >24 Y     0 - 149       150 - 199    200- 499    >/= 500  .   Venipuncture immediately after or during the    administration of Metamizole may lead to falsely   low results. Testing should be performed immediately   prior to Metamizole dosing.     11/27/2019 161 (H) 0 - 149 mg/dL Final     Comment:     .      AGE      DESIRABLE   BORDERLINE HIGH   HIGH     VERY HIGH   0 D-90 D    19 - 174         ----         ----        ----  91 D- 9 Y     0 -  74        75 -  99     >/= 100      ----    10-19 Y     0 -  89        90 - 129     >/= 130      ----    20-24 Y     0 - 114       115 - 149     >/= 150      ----         >24 Y     0 - 149       150 - 199    200- 499    >/= 500  .   Venipuncture immediately after or during the    administration of Metamizole may lead to falsely   low results. Testing should be performed immediately   prior to Metamizole dosing.       HDL   Date Value Ref Range Status   11/30/2021 39.5 (A) mg/dL Final     Comment:     .      AGE      VERY LOW   LOW     NORMAL    HIGH       0-19 Y       < 35   < 40     40-45     ----    20-24 Y       ----   < 40       >45     ----      >24 Y       ----   < 40     40-60      >60  .     12/01/2020 39.0 (A) mg/dL Final     Comment:     .      AGE      VERY LOW   LOW     NORMAL    HIGH       0-19 Y       < 35   < 40     40-45     ----    20-24 Y       ----   < 40       >45     ----      >24 Y       ----   < 40     40-60      >60  .     11/27/2019 37.6 (A) mg/dL Final     Comment:     .      AGE      VERY LOW   LOW     NORMAL    HIGH       0-19 Y       < 35   < 40     40-45     ----    20-24 Y       ----   < 40       >45     ----      >24 Y       ----   < 40     40-60      >60  .       No results found for: \"LDL\"  TSH   Date Value Ref Range Status   11/30/2021 2.21 " "0.44 - 3.98 mIU/L Final     Comment:      TSH testing is performed using different testing    methodology at [facility] than at other    [facility] hospitals. Direct result comparisons should    only be made within the same method.       No results found for: \"A1C\"  No components found for: \"POCA1C\"  No results found for: \"ALBUR\"  No components found for: \"POCALBUR\"      Other concerns:     ER/urgicare visits in the last year- 1/2024 lf shoulder pain  Hospitalizations in the last year- none    FH colon ca-none  FH prostate ca-none    Exercise-  work; walking and lifting too; has elliptical  Water and coffee  Diet-3 meals a day   Body mass index is 39.49 kg/m².    last dental appt- 2024    BMs-regular  Sleep-able to fall asleep and stay asleep; no snoring or apnea  no cp, swelling, sob, abd pain, n/v/d/c, blood in stool or black stools  STI testing including hiv (age 15-65) and hep c screening (18-79)-declines        Immunization History   Administered Date(s) Administered    Flu vaccine (IIV4), preservative free *Check age/dose* 10/11/2023    Moderna SARS-CoV-2 Vaccination 01/06/2021, 02/03/2021, 12/11/2021    Tdap vaccine, age 7 year and older (BOOSTRIX, ADACEL) 01/02/2014     Flu shot-today  Tdap-today    fractures in lifetime-toes  Fh osteoporosis-none      FH heart attack, heart surgery-none  FH stroke-none    The ASCVD Risk score (Houston DK, et al., 2019) failed to calculate for the following reasons:    The 2019 ASCVD risk score is only valid for ages 40 to 79        Review of Systems   Constitutional:  Negative for chills, fatigue and fever.   HENT:  Negative for congestion, ear pain, postnasal drip, rhinorrhea and sore throat.    Eyes:  Negative for discharge, redness and visual disturbance.   Respiratory:  Negative for cough, shortness of breath and wheezing.    Cardiovascular:  Negative for chest pain, palpitations and leg swelling.   Gastrointestinal:  Negative for abdominal pain, blood in " stool, constipation, diarrhea, nausea and vomiting.   Endocrine: Negative for cold intolerance, polydipsia, polyphagia and polyuria.   Genitourinary:  Negative for dysuria, frequency, genital sores, hematuria, penile pain, testicular pain and urgency.   Musculoskeletal:  Negative for arthralgias and myalgias.   Skin:  Negative for rash.   Neurological:  Negative for dizziness, syncope and headaches.   Hematological:  Negative for adenopathy. Does not bruise/bleed easily.   Psychiatric/Behavioral:  Negative for dysphoric mood. The patient is not nervous/anxious.        Objective   Visit Vitals  /90   Pulse 81   Temp 36.4 °C (97.5 °F)        BP Readings from Last 3 Encounters:   10/15/24 157/90   05/06/24 135/79   01/22/24 158/83     Wt Readings from Last 3 Encounters:   10/15/24 125 kg (275 lb 3.2 oz)   05/06/24 121 kg (266 lb)   01/22/24 126 kg (276 lb 12.8 oz)           Physical Exam  Vitals reviewed.   Constitutional:       General: He is not in acute distress.     Appearance: Normal appearance. He is not ill-appearing.   HENT:      Head: Normocephalic.      Right Ear: Tympanic membrane, ear canal and external ear normal.      Left Ear: Tympanic membrane, ear canal and external ear normal.      Nose: Nose normal.      Mouth/Throat:      Mouth: Mucous membranes are moist.      Pharynx: Oropharynx is clear. No oropharyngeal exudate or posterior oropharyngeal erythema.   Eyes:      Extraocular Movements: Extraocular movements intact.      Conjunctiva/sclera: Conjunctivae normal.      Pupils: Pupils are equal, round, and reactive to light.   Cardiovascular:      Rate and Rhythm: Normal rate and regular rhythm.      Heart sounds: Normal heart sounds. No murmur heard.  Pulmonary:      Effort: Pulmonary effort is normal. No respiratory distress.      Breath sounds: Normal breath sounds. No wheezing, rhonchi or rales.   Abdominal:      General: Bowel sounds are normal. There is no distension.      Palpations:  Abdomen is soft. There is no mass.      Tenderness: There is no abdominal tenderness.   Musculoskeletal:         General: No swelling or tenderness. Normal range of motion.      Cervical back: Normal range of motion and neck supple. No tenderness.      Right lower leg: No edema.      Left lower leg: No edema.   Lymphadenopathy:      Cervical: No cervical adenopathy.   Skin:     General: Skin is warm.      Findings: No rash.   Neurological:      General: No focal deficit present.      Mental Status: He is alert and oriented to person, place, and time.      Cranial Nerves: No cranial nerve deficit.   Psychiatric:         Mood and Affect: Mood normal.         Behavior: Behavior normal.         Assessment/Plan   Diagnoses and all orders for this visit:  Routine general medical examination at a health care facility  -     Comprehensive Metabolic Panel; Future  -     CBC and Auto Differential; Future  -     Lipid Panel; Future  -     TSH with reflex to Free T4 if abnormal; Future  BMI 39.0-39.9,adult  Class 2 severe obesity due to excess calories with serious comorbidity and body mass index (BMI) of 39.0 to 39.9 in adult  LISA on CPAP  Primary hypertension  Pure hypercholesterolemia  Seasonal allergies  -     montelukast (Singulair) 10 mg tablet; Take 1 tablet (10 mg) by mouth once daily at bedtime.  Other orders  -     Follow Up In Primary Care - Health Maintenance  -     Flu vaccine, trivalent, preservative free, age 6 months and greater (Fluarix/Fluzone/Flulaval)  -     Tdap vaccine, age 7 years and older

## 2024-10-15 ENCOUNTER — LAB (OUTPATIENT)
Dept: LAB | Facility: LAB | Age: 34
End: 2024-10-15
Payer: COMMERCIAL

## 2024-10-15 ENCOUNTER — APPOINTMENT (OUTPATIENT)
Dept: PRIMARY CARE | Facility: CLINIC | Age: 34
End: 2024-10-15
Payer: COMMERCIAL

## 2024-10-15 VITALS
OXYGEN SATURATION: 95 % | BODY MASS INDEX: 39.4 KG/M2 | SYSTOLIC BLOOD PRESSURE: 137 MMHG | TEMPERATURE: 97.5 F | HEIGHT: 70 IN | DIASTOLIC BLOOD PRESSURE: 87 MMHG | WEIGHT: 275.2 LBS | HEART RATE: 81 BPM

## 2024-10-15 DIAGNOSIS — E78.00 PURE HYPERCHOLESTEROLEMIA: ICD-10-CM

## 2024-10-15 DIAGNOSIS — I10 PRIMARY HYPERTENSION: ICD-10-CM

## 2024-10-15 DIAGNOSIS — Z00.00 ROUTINE GENERAL MEDICAL EXAMINATION AT A HEALTH CARE FACILITY: ICD-10-CM

## 2024-10-15 DIAGNOSIS — Z00.00 ROUTINE GENERAL MEDICAL EXAMINATION AT A HEALTH CARE FACILITY: Primary | ICD-10-CM

## 2024-10-15 DIAGNOSIS — G47.33 OSA ON CPAP: ICD-10-CM

## 2024-10-15 DIAGNOSIS — E66.01 CLASS 2 SEVERE OBESITY DUE TO EXCESS CALORIES WITH SERIOUS COMORBIDITY AND BODY MASS INDEX (BMI) OF 39.0 TO 39.9 IN ADULT: ICD-10-CM

## 2024-10-15 DIAGNOSIS — E66.812 CLASS 2 SEVERE OBESITY DUE TO EXCESS CALORIES WITH SERIOUS COMORBIDITY AND BODY MASS INDEX (BMI) OF 39.0 TO 39.9 IN ADULT: ICD-10-CM

## 2024-10-15 DIAGNOSIS — J30.2 SEASONAL ALLERGIES: ICD-10-CM

## 2024-10-15 LAB
ALBUMIN SERPL BCP-MCNC: 4.5 G/DL (ref 3.4–5)
ALP SERPL-CCNC: 66 U/L (ref 33–120)
ALT SERPL W P-5'-P-CCNC: 33 U/L (ref 10–52)
ANION GAP SERPL CALC-SCNC: 11 MMOL/L (ref 10–20)
AST SERPL W P-5'-P-CCNC: 28 U/L (ref 9–39)
BASOPHILS # BLD AUTO: 0.08 X10*3/UL (ref 0–0.1)
BASOPHILS NFR BLD AUTO: 1 %
BILIRUB SERPL-MCNC: 0.5 MG/DL (ref 0–1.2)
BUN SERPL-MCNC: 16 MG/DL (ref 6–23)
CALCIUM SERPL-MCNC: 9.4 MG/DL (ref 8.6–10.6)
CHLORIDE SERPL-SCNC: 104 MMOL/L (ref 98–107)
CHOLEST SERPL-MCNC: 196 MG/DL (ref 0–199)
CHOLESTEROL/HDL RATIO: 5.1
CO2 SERPL-SCNC: 29 MMOL/L (ref 21–32)
CREAT SERPL-MCNC: 0.78 MG/DL (ref 0.5–1.3)
EGFRCR SERPLBLD CKD-EPI 2021: >90 ML/MIN/1.73M*2
EOSINOPHIL # BLD AUTO: 0.39 X10*3/UL (ref 0–0.7)
EOSINOPHIL NFR BLD AUTO: 4.9 %
ERYTHROCYTE [DISTWIDTH] IN BLOOD BY AUTOMATED COUNT: 12.2 % (ref 11.5–14.5)
GLUCOSE SERPL-MCNC: 103 MG/DL (ref 74–99)
HCT VFR BLD AUTO: 46.7 % (ref 41–52)
HDLC SERPL-MCNC: 38.7 MG/DL
HGB BLD-MCNC: 15 G/DL (ref 13.5–17.5)
IMM GRANULOCYTES # BLD AUTO: 0.02 X10*3/UL (ref 0–0.7)
IMM GRANULOCYTES NFR BLD AUTO: 0.3 % (ref 0–0.9)
LDLC SERPL CALC-MCNC: 127 MG/DL
LYMPHOCYTES # BLD AUTO: 2.07 X10*3/UL (ref 1.2–4.8)
LYMPHOCYTES NFR BLD AUTO: 26.1 %
MCH RBC QN AUTO: 29.4 PG (ref 26–34)
MCHC RBC AUTO-ENTMCNC: 32.1 G/DL (ref 32–36)
MCV RBC AUTO: 91 FL (ref 80–100)
MONOCYTES # BLD AUTO: 0.53 X10*3/UL (ref 0.1–1)
MONOCYTES NFR BLD AUTO: 6.7 %
NEUTROPHILS # BLD AUTO: 4.83 X10*3/UL (ref 1.2–7.7)
NEUTROPHILS NFR BLD AUTO: 61 %
NON HDL CHOLESTEROL: 157 MG/DL (ref 0–149)
NRBC BLD-RTO: 0 /100 WBCS (ref 0–0)
PLATELET # BLD AUTO: 263 X10*3/UL (ref 150–450)
POTASSIUM SERPL-SCNC: 4.1 MMOL/L (ref 3.5–5.3)
PROT SERPL-MCNC: 7.2 G/DL (ref 6.4–8.2)
RBC # BLD AUTO: 5.11 X10*6/UL (ref 4.5–5.9)
SODIUM SERPL-SCNC: 140 MMOL/L (ref 136–145)
TRIGL SERPL-MCNC: 153 MG/DL (ref 0–149)
TSH SERPL-ACNC: 0.98 MIU/L (ref 0.44–3.98)
VLDL: 31 MG/DL (ref 0–40)
WBC # BLD AUTO: 7.9 X10*3/UL (ref 4.4–11.3)

## 2024-10-15 PROCEDURE — 99395 PREV VISIT EST AGE 18-39: CPT | Performed by: NURSE PRACTITIONER

## 2024-10-15 PROCEDURE — 3008F BODY MASS INDEX DOCD: CPT | Performed by: NURSE PRACTITIONER

## 2024-10-15 PROCEDURE — 36415 COLL VENOUS BLD VENIPUNCTURE: CPT

## 2024-10-15 PROCEDURE — 80061 LIPID PANEL: CPT

## 2024-10-15 PROCEDURE — 3075F SYST BP GE 130 - 139MM HG: CPT | Performed by: NURSE PRACTITIONER

## 2024-10-15 PROCEDURE — 90656 IIV3 VACC NO PRSV 0.5 ML IM: CPT | Performed by: NURSE PRACTITIONER

## 2024-10-15 PROCEDURE — 90472 IMMUNIZATION ADMIN EACH ADD: CPT | Performed by: NURSE PRACTITIONER

## 2024-10-15 PROCEDURE — 80053 COMPREHEN METABOLIC PANEL: CPT

## 2024-10-15 PROCEDURE — 84443 ASSAY THYROID STIM HORMONE: CPT

## 2024-10-15 PROCEDURE — 90715 TDAP VACCINE 7 YRS/> IM: CPT | Performed by: NURSE PRACTITIONER

## 2024-10-15 PROCEDURE — 3079F DIAST BP 80-89 MM HG: CPT | Performed by: NURSE PRACTITIONER

## 2024-10-15 PROCEDURE — 1036F TOBACCO NON-USER: CPT | Performed by: NURSE PRACTITIONER

## 2024-10-15 PROCEDURE — 90471 IMMUNIZATION ADMIN: CPT | Performed by: NURSE PRACTITIONER

## 2024-10-15 PROCEDURE — 85025 COMPLETE CBC W/AUTO DIFF WBC: CPT

## 2024-10-15 RX ORDER — MONTELUKAST SODIUM 10 MG/1
10 TABLET ORAL NIGHTLY
Qty: 90 TABLET | Refills: 4 | Status: SHIPPED | OUTPATIENT
Start: 2024-10-15 | End: 2026-01-08

## 2024-10-15 ASSESSMENT — PATIENT HEALTH QUESTIONNAIRE - PHQ9
SUM OF ALL RESPONSES TO PHQ9 QUESTIONS 1 AND 2: 0
1. LITTLE INTEREST OR PLEASURE IN DOING THINGS: NOT AT ALL
2. FEELING DOWN, DEPRESSED OR HOPELESS: NOT AT ALL

## 2024-10-15 NOTE — PATIENT INSTRUCTIONS
Handouts given to pt:  physical handout        Labs- No appt needed:    You can use the lab in our building when fasting. The hrs are: Mon-Fri 7a-330p.  No Saturday hrs. Bring the paper order.   OR   [facility] Mon-Fri 7a-12p. No Saturday hrs. Bring the paper order.  OR   [facility] Mon-Fri 630a-530p or Sat 6:30a-12p. Bring the paper order  OR  [facility] Mon-FrI 7a-5p, Sat 8a-12p  [facility] Mon-Fri 730a-4p, Sat  8a-12p  [facility] [address] Mon-Thurs 630a-6p , Fri 630a-4p, Sat 8a-12p  [facility] [address] Mon-Fri 7a-630p and Sat. 7a-3p    Fasting is no food, drink, gum or mints other than water for 12 hrs.   Results will be back in 2-3 business days for most labs. It is always recommended for any orders (labs, xrays, ultrasounds,MRI, ct scan, procedures etc) to check with your insurance provider for expected costs or expenses to you.         You will get your results via phone from my medical assistant if you do not have MyChart.  OR  You will get your results via [employer]    If a result is urgent, I will call to speak to you.    Vaccines:  ---- flu vaccine-today    ---- Tdap-today        General recommendations:  Exercise-cardio 4-5d/wk 30min each day  Diet-Breakfast-toast (my favorite Lexy Harris Delighful Multigrain or Faust[employer] Killer Bread Good Seed thin-sliced)/bagel/English muffin-whole wheat flour as a 1st ingredient or cereal/oatmeal/granola bar-fiber 4g or more or protein like eggs or peanut butter; optional veggies  Lunch-protein, 1/2c carb or 2 slices bread, veg 1c  Dinner-protein, fist sized carb, veg 1c  Fruit 2 a day  Dairy 2 a day-milk, soy milk, almond milk, cheese, yogurt, cottage cheese  Snacks-Protein-hard boiled egg, nuts (walnuts/almonds/pecans/pistachios 1/4c), hummus, beef/deer jerky or meat sticks; vegetable, fruit, dairy-milk(1%, skim, almond, soy)/cheese (not a lot of cheddar)/yogurt (Greek is best-my favorite Dannon Fruit on  the Bottom Greek)/cottage cheese 2%; triscuits/ popcorn/wheat thins have a lot of fiber; follow serving size on bag/box/container  increase water  Limit alcohol to 1 drink per day for women and 2 drinks per day for men (1 drink=12oz beer or 5oz wine or 1 1/2oz liquor)  Calcium: 500mg 1 twice a day if age 50 and younger and 600mg 1 twice a day if over age 50 (calcium citrate can be taken without food)  Vitamin D: 800-5000 IU/day  Limit salt to <2300mg a day if age 50 and under and <1500mg a day if over age 50/have high bp or diabetes or kidney disease  Recommend folate for childbearing age women 0.4mg per day (can be found in a multivitamin)  Recommend 18mg/dL of iron a day if age 50 and under and 8mg/dL a day if over age 50; take on an empty stomach at bedtime  Use sunscreen   Wear seatbelt  Recommend safe sex practices: using condoms everytime you have sex, discuss with a new partner about their past partners/history of STDs/drug use, avoid drinking alcohol or using drugs as this increases the chance that you will participate in high-risk sex, for oral sex help protect your mouth by having your partner use a condom (male or female), women should not douche after sex, be aware of your partner's body and your body-look for signs of a sore, blister, rash, or discharge, and have regular exams and periodic tests for STDs.  No distracted driving  No driving when under influence of substances  Wear a seatbelt  Eye dr every 1-2yrs  Dentist every 6-12 mon  Tetanus shot every 10yrs  Recommend flu vaccine in the fall  Appt in  1 year for physical      I will communicate with you via [employer] regarding messages and results. If you need help with this, you can call the support line at [phone].    IT WAS A PLEASURE TO SEE YOU TODAY. THANK YOU FOR CHOOSING US FOR YOUR HEALTHCARE NEEDS.

## 2024-10-16 DIAGNOSIS — R73.9 ELEVATED BLOOD SUGAR: Primary | ICD-10-CM

## 2025-08-09 DIAGNOSIS — I10 PRIMARY HYPERTENSION: ICD-10-CM

## 2025-08-11 RX ORDER — LISINOPRIL 20 MG/1
20 TABLET ORAL DAILY
Qty: 90 TABLET | Refills: 2 | Status: SHIPPED | OUTPATIENT
Start: 2025-08-11

## 2025-10-16 ENCOUNTER — APPOINTMENT (OUTPATIENT)
Dept: PRIMARY CARE | Facility: CLINIC | Age: 35
End: 2025-10-16
Payer: COMMERCIAL